# Patient Record
Sex: FEMALE | Race: OTHER | HISPANIC OR LATINO
[De-identification: names, ages, dates, MRNs, and addresses within clinical notes are randomized per-mention and may not be internally consistent; named-entity substitution may affect disease eponyms.]

---

## 2017-02-24 ENCOUNTER — APPOINTMENT (OUTPATIENT)
Dept: PULMONOLOGY | Facility: CLINIC | Age: 57
End: 2017-02-24

## 2017-09-26 ENCOUNTER — APPOINTMENT (OUTPATIENT)
Dept: OPHTHALMOLOGY | Facility: CLINIC | Age: 57
End: 2017-09-26

## 2020-04-05 ENCOUNTER — EMERGENCY (EMERGENCY)
Facility: HOSPITAL | Age: 60
LOS: 1 days | Discharge: HOME | End: 2020-04-05
Payer: COMMERCIAL

## 2020-04-05 VITALS
WEIGHT: 147.05 LBS | HEART RATE: 100 BPM | TEMPERATURE: 100 F | HEIGHT: 56 IN | DIASTOLIC BLOOD PRESSURE: 68 MMHG | SYSTOLIC BLOOD PRESSURE: 120 MMHG | RESPIRATION RATE: 18 BRPM | OXYGEN SATURATION: 99 %

## 2020-04-05 PROCEDURE — 99283 EMERGENCY DEPT VISIT LOW MDM: CPT

## 2020-04-05 PROCEDURE — 99282 EMERGENCY DEPT VISIT SF MDM: CPT

## 2020-04-05 PROCEDURE — T1013: CPT

## 2020-04-05 RX ORDER — ACETAMINOPHEN 500 MG
650 TABLET ORAL ONCE
Refills: 0 | Status: COMPLETED | OUTPATIENT
Start: 2020-04-05 | End: 2020-04-05

## 2020-04-05 RX ADMIN — Medication 650 MILLIGRAM(S): at 17:52

## 2020-04-05 NOTE — ED PROVIDER NOTE - CLINICAL SUMMARY MEDICAL DECISION MAKING FREE TEXT BOX
Pt well appearing, in NAD, non-toxic appearing. Not hypoxic. No tachypnea, lungs clear on exam. I had lengthy discussion with patient  regarding their symptoms, provided re-assurance and educated pt on strict return precautions. Pt educated on proper supportive care, infection control precautions, which patient populations to avoid and importance of self-quarantining at home. Pt not tested for COVID in ED but given number for testing sites if they decide to seek testing. Stable for discharge home.

## 2020-04-05 NOTE — ED PROVIDER NOTE - OBJECTIVE STATEMENT
60 yo F PMH DM (accucheck 100 today) presented to ED with c/o fevers, chills, cough, body aches x 4 days. Denies Cp, palp or SOB. Denies abd pain, N/v/D

## 2020-04-05 NOTE — ED PROVIDER NOTE - NSFOLLOWUPINSTRUCTIONS_ED_ALL_ED_FT
1. TAKE ALL MEDICATIONS AS DIRECTED.  FOR PAIN YOU CAN TAKE IBUPROFEN (MOTRIN, ADVIL) OR ACETAMINOPHEN (TYLENOL) AS NEEDED, AS DIRECTED ON PACKAGING.  2. FOLLOW UP WITH __________ AS DIRECTED.  YOU WERE GIVEN COPIES OF ALL LABS AND IMAGING RESULTS FROM YOUR ER VISIT--PLEASE TAKE THEM WITH YOU TO YOUR APPOINTMENT.  3. IF NEEDED, CALL [phone] TO FIND A PRIMARY CARE PHYSICIAN.  OR CALL [phone] TO MAKE AN APPOINTMENT WITH THE MEDICAL CLINIC.  4. RETURN TO THE ER FOR ANY WORSENING SYMPTOMS.    - Follow up with your doctor within 2-3 days.   - Return to the ED for any new or worsening symptoms including but not limited to difficulty breathing, severe weakness, confusion, etc.  - Take Tylenol (Acetaminophen) 650mg as needed for pain or fever  - Stay well hydrated.   - Avoid contact with anybody who is sick OR at risk populations including elderly, young, pregnant patients, immune compromised people  - Wash hands frequently in warm soapy water for at least 20 seconds   - Quarantine yourself at home for at least 2 weeks  - If you would like an appointment to be tested for COVID-19 (coronavirus) at one of the testing sites, contact [phone] for an appointment.      Viral Respiratory Infection    A viral respiratory infection is an illness that affects parts of the body used for breathing, like the lungs, nose, and throat. It is caused by a germ called a virus. Symptoms can include runny nose, coughing, sneezing, fatigue, body aches, sore throat, fever, or headache. Over the counter medicine can be used to manage the symptoms but the infection typically goes away on its own in 5 to 10 days.     SEEK IMMEDIATE MEDICAL CARE IF YOU HAVE ANY OF THE FOLLOWING SYMPTOMS: shortness of breath, chest pain, fever over 10 days, or lightheadedness/dizziness.

## 2020-04-05 NOTE — ED PROVIDER NOTE - PATIENT PORTAL LINK FT
You can access the FollowMyHealth Patient Portal offered by [facility] by registering at the following website: [email or web address]. By joining [employer]’s FollowMyHealth portal, you will also be able to view your health information using other applications (apps) compatible with our system.

## 2020-04-11 ENCOUNTER — TRANSCRIPTION ENCOUNTER (OUTPATIENT)
Age: 60
End: 2020-04-11

## 2021-02-25 ENCOUNTER — TRANSCRIPTION ENCOUNTER (OUTPATIENT)
Age: 61
End: 2021-02-25

## 2021-06-02 ENCOUNTER — TRANSCRIPTION ENCOUNTER (OUTPATIENT)
Age: 61
End: 2021-06-02

## 2022-11-07 ENCOUNTER — OFFICE (OUTPATIENT)
Dept: URBAN - METROPOLITAN AREA CLINIC 115 | Facility: CLINIC | Age: 62
Setting detail: OPHTHALMOLOGY
End: 2022-11-07
Payer: COMMERCIAL

## 2022-11-07 DIAGNOSIS — H15.123: ICD-10-CM

## 2022-11-07 DIAGNOSIS — H11.043: ICD-10-CM

## 2022-11-07 PROCEDURE — 99213 OFFICE O/P EST LOW 20 MIN: CPT | Performed by: OPHTHALMOLOGY

## 2022-11-07 ASSESSMENT — REFRACTION_AUTOREFRACTION
OS_AXIS: 059
OS_CYLINDER: -0.50
OS_SPHERE: +1.50
OD_AXIS: 115
OD_SPHERE: +1.00
OD_CYLINDER: -0.50

## 2022-11-07 ASSESSMENT — KERATOMETRY
OD_K1POWER_DIOPTERS: 45.75
OD_AXISANGLE_DEGREES: 082
OS_K2POWER_DIOPTERS: 47.25
OS_K1POWER_DIOPTERS: 46.45
OD_K2POWER_DIOPTERS: 47.25
OS_AXISANGLE_DEGREES: 100
METHOD_AUTO_MANUAL: AUTO

## 2022-11-07 ASSESSMENT — REFRACTION_MANIFEST
OS_SPHERE: +0.75
OS_VA1: 20/20
OS_ADD: +1.75
OD_ADD: +1.75
OD_CYLINDER: -0.50
OD_VA1: 20/20
OD_SPHERE: +0.75
OD_AXIS: 120

## 2022-11-07 ASSESSMENT — AXIALLENGTH_DERIVED
OS_AL: 21.99
OD_AL: 22.2756
OD_AL: 22.3627

## 2022-11-07 ASSESSMENT — TONOMETRY
OD_IOP_MMHG: 17
OS_IOP_MMHG: 16

## 2022-11-07 ASSESSMENT — SPHEQUIV_DERIVED
OD_SPHEQUIV: 0.75
OS_SPHEQUIV: 1.25
OD_SPHEQUIV: 0.5

## 2022-11-07 ASSESSMENT — VISUAL ACUITY
OS_BCVA: 20/20
OD_BCVA: 20/40+1

## 2022-11-07 ASSESSMENT — CONFRONTATIONAL VISUAL FIELD TEST (CVF)
OD_FINDINGS: FULL
OS_FINDINGS: FULL

## 2023-05-15 ENCOUNTER — OFFICE (OUTPATIENT)
Dept: URBAN - METROPOLITAN AREA CLINIC 115 | Facility: CLINIC | Age: 63
Setting detail: OPHTHALMOLOGY
End: 2023-05-15
Payer: COMMERCIAL

## 2023-05-15 DIAGNOSIS — H15.123: ICD-10-CM

## 2023-05-15 DIAGNOSIS — H52.4: ICD-10-CM

## 2023-05-15 DIAGNOSIS — H47.233: ICD-10-CM

## 2023-05-15 DIAGNOSIS — H11.043: ICD-10-CM

## 2023-05-15 PROCEDURE — 92014 COMPRE OPH EXAM EST PT 1/>: CPT | Performed by: OPHTHALMOLOGY

## 2023-05-15 PROCEDURE — 92015 DETERMINE REFRACTIVE STATE: CPT | Performed by: OPHTHALMOLOGY

## 2023-05-15 PROCEDURE — 76514 ECHO EXAM OF EYE THICKNESS: CPT | Performed by: OPHTHALMOLOGY

## 2023-05-15 PROCEDURE — 92133 CPTRZD OPH DX IMG PST SGM ON: CPT | Performed by: OPHTHALMOLOGY

## 2023-05-15 ASSESSMENT — PACHYMETRY
OS_CT_CORRECTION: -4
OD_CT_UM: 602
OS_CT_UM: 593
OD_CT_CORRECTION: -4

## 2023-05-15 ASSESSMENT — KERATOMETRY
OS_AXISANGLE_DEGREES: 100
OD_K1POWER_DIOPTERS: 45.75
OD_K2POWER_DIOPTERS: 47.25
METHOD_AUTO_MANUAL: AUTO
OS_K2POWER_DIOPTERS: 47.25
OD_AXISANGLE_DEGREES: 082
OS_K1POWER_DIOPTERS: 46.45

## 2023-05-15 ASSESSMENT — SPHEQUIV_DERIVED
OS_SPHEQUIV: 0.5
OD_SPHEQUIV: 0.5
OD_SPHEQUIV: 0.75
OS_SPHEQUIV: 1

## 2023-05-15 ASSESSMENT — REFRACTION_AUTOREFRACTION
OD_AXIS: 118
OS_SPHERE: +1.25
OS_CYLINDER: -0.50
OD_SPHERE: +1.00
OD_CYLINDER: -0.50
OS_AXIS: 041

## 2023-05-15 ASSESSMENT — REFRACTION_MANIFEST
OS_SPHERE: +0.75
OD_VA1: 20/20
OS_VA1: 20/20
OD_SPHERE: +0.75
OD_ADD: +1.75
OS_AXIS: 50
OS_CYLINDER: -0.50
OD_AXIS: 120
OD_CYLINDER: -0.50
OS_ADD: +1.75

## 2023-05-15 ASSESSMENT — CONFRONTATIONAL VISUAL FIELD TEST (CVF)
OS_FINDINGS: FULL
OD_FINDINGS: FULL

## 2023-05-15 ASSESSMENT — AXIALLENGTH_DERIVED
OD_AL: 22.2756
OD_AL: 22.3627
OS_AL: 22.25
OS_AL: 22.08

## 2023-05-15 ASSESSMENT — TONOMETRY
OS_IOP_MMHG: 17
OS_IOP_MMHG: 18
OD_IOP_MMHG: 19
OD_IOP_MMHG: 20

## 2023-05-15 ASSESSMENT — VISUAL ACUITY
OS_BCVA: 20/25
OD_BCVA: 20/30